# Patient Record
Sex: FEMALE | Race: WHITE | Employment: OTHER | URBAN - METROPOLITAN AREA
[De-identification: names, ages, dates, MRNs, and addresses within clinical notes are randomized per-mention and may not be internally consistent; named-entity substitution may affect disease eponyms.]

---

## 2017-01-31 PROBLEM — I70.0 ATHEROSCLEROSIS OF AORTA (HCC): Status: ACTIVE | Noted: 2017-01-31

## 2017-01-31 PROBLEM — I77.9 BILATERAL CAROTID ARTERY DISEASE (HCC): Status: ACTIVE | Noted: 2017-01-31

## 2017-03-31 ENCOUNTER — HOSPITAL ENCOUNTER (OUTPATIENT)
Dept: CT IMAGING | Facility: HOSPITAL | Age: 67
Discharge: HOME | End: 2017-03-31
Attending: FAMILY MEDICINE

## 2017-03-31 DIAGNOSIS — Z13.9 ENCOUNTER FOR SCREENING: ICD-10-CM

## 2017-04-03 PROBLEM — C50.919 BREAST CANCER (HCC): Status: ACTIVE | Noted: 2017-04-03

## 2017-04-04 ENCOUNTER — TELEPHONE (OUTPATIENT)
Dept: HEMATOLOGY/ONCOLOGY | Facility: HOSPITAL | Age: 67
End: 2017-04-04

## 2017-04-04 ENCOUNTER — OFFICE VISIT (OUTPATIENT)
Dept: HEMATOLOGY/ONCOLOGY | Facility: HOSPITAL | Age: 67
End: 2017-04-04
Attending: INTERNAL MEDICINE
Payer: MEDICARE

## 2017-04-04 VITALS
SYSTOLIC BLOOD PRESSURE: 127 MMHG | DIASTOLIC BLOOD PRESSURE: 67 MMHG | TEMPERATURE: 98 F | BODY MASS INDEX: 23.29 KG/M2 | OXYGEN SATURATION: 99 % | HEIGHT: 67.99 IN | WEIGHT: 153.63 LBS | HEART RATE: 92 BPM | RESPIRATION RATE: 18 BRPM

## 2017-04-04 DIAGNOSIS — M89.9 BONE DISORDER: ICD-10-CM

## 2017-04-04 DIAGNOSIS — G47.00 INSOMNIA, UNSPECIFIED TYPE: ICD-10-CM

## 2017-04-04 DIAGNOSIS — C50.811 MALIGNANT NEOPLASM OF OVERLAPPING SITES OF RIGHT FEMALE BREAST (HCC): ICD-10-CM

## 2017-04-04 DIAGNOSIS — M85.80 OSTEOPENIA, UNSPECIFIED LOCATION: Primary | ICD-10-CM

## 2017-04-04 DIAGNOSIS — R92.8 ABNORMAL MAMMOGRAM OF LEFT BREAST: ICD-10-CM

## 2017-04-04 PROCEDURE — 99214 OFFICE O/P EST MOD 30 MIN: CPT | Performed by: INTERNAL MEDICINE

## 2017-04-04 RX ORDER — ALPRAZOLAM 0.5 MG/1
0.5 TABLET ORAL NIGHTLY PRN
Qty: 30 TABLET | Refills: 3 | Status: SHIPPED | OUTPATIENT
Start: 2017-04-04 | End: 2019-01-08

## 2017-04-04 NOTE — PROGRESS NOTES
[facility] Progress Note      Patient Name:  Irina Sharma  YOB: 1950  Medical Record Number:  [id number]    Date of visit:  4/4/2017    CHIEF COMPLAINT: Stage I right breast carcinoma, status post mastectomy and reconstruction    H Take by mouth. Disp:  Rfl:    Multiple Vitamins-Minerals (RA ONE DAILY GUMMY VITES) Oral Chew Tab Chew  by mouth daily. Disp:  Rfl:    Misc Natural Products (OSTEO BI-FLEX ADV DOUBLE ST OR) Take 1 tablet by mouth daily.  Disp:  Rfl:    ascorbic acid (VITAMI llargest focus 1.5 cm,  ER/DE positive, HER2 negative with  Oncotype DX recurrence score was 10. She started anastrozole in 3/12 but took it sporadically due to vasomotor symptoms and arthralgias and finally  stopped it completely in 10/16.  She does not wi

## 2017-04-04 NOTE — PROGRESS NOTES
Pt here for 6 month MD f/u for hx of RB ca. Continues to be off Arimidex. States joint pain is better, but still having mild hot flashes. Pt reports that she is having trouble staying asleep most nights, would like to try xanax.      Education Record    Meredith Roy

## 2017-04-04 NOTE — TELEPHONE ENCOUNTER
Yessi Rdz MD  [address]                     Call, labs ok         Patient made aware of results and verbalized understanding.

## 2017-04-30 ENCOUNTER — SNF/IP PROF CHARGE ONLY (OUTPATIENT)
Dept: HEMATOLOGY/ONCOLOGY | Facility: HOSPITAL | Age: 67
End: 2017-04-30

## 2017-04-30 DIAGNOSIS — C50.811 MALIGNANT NEOPLASM OF OVERLAPPING SITES OF RIGHT BREAST IN FEMALE, ESTROGEN RECEPTOR POSITIVE (HCC): Primary | ICD-10-CM

## 2017-04-30 DIAGNOSIS — Z17.0 MALIGNANT NEOPLASM OF OVERLAPPING SITES OF RIGHT BREAST IN FEMALE, ESTROGEN RECEPTOR POSITIVE (HCC): Primary | ICD-10-CM

## 2017-04-30 PROCEDURE — G9678 ONCOLOGY CARE MODEL SERVICE: HCPCS | Performed by: INTERNAL MEDICINE

## 2017-05-25 PROCEDURE — 88305 TISSUE EXAM BY PATHOLOGIST: CPT | Performed by: INTERNAL MEDICINE

## 2017-05-28 PROBLEM — K63.5 POLYP OF TRANSVERSE COLON: Status: ACTIVE | Noted: 2017-05-28

## 2017-05-31 ENCOUNTER — SNF/IP PROF CHARGE ONLY (OUTPATIENT)
Dept: HEMATOLOGY/ONCOLOGY | Facility: HOSPITAL | Age: 67
End: 2017-05-31

## 2017-05-31 DIAGNOSIS — C50.811 MALIGNANT NEOPLASM OF OVERLAPPING SITES OF RIGHT BREAST IN FEMALE, ESTROGEN RECEPTOR POSITIVE (HCC): Primary | ICD-10-CM

## 2017-05-31 DIAGNOSIS — Z17.0 MALIGNANT NEOPLASM OF OVERLAPPING SITES OF RIGHT BREAST IN FEMALE, ESTROGEN RECEPTOR POSITIVE (HCC): Primary | ICD-10-CM

## 2017-05-31 PROCEDURE — G9678 ONCOLOGY CARE MODEL SERVICE: HCPCS | Performed by: INTERNAL MEDICINE

## 2017-06-30 ENCOUNTER — SNF/IP PROF CHARGE ONLY (OUTPATIENT)
Dept: HEMATOLOGY/ONCOLOGY | Facility: HOSPITAL | Age: 67
End: 2017-06-30

## 2017-06-30 DIAGNOSIS — Z17.0 MALIGNANT NEOPLASM OF OVERLAPPING SITES OF RIGHT BREAST IN FEMALE, ESTROGEN RECEPTOR POSITIVE (HCC): ICD-10-CM

## 2017-06-30 DIAGNOSIS — C50.811 MALIGNANT NEOPLASM OF OVERLAPPING SITES OF RIGHT BREAST IN FEMALE, ESTROGEN RECEPTOR POSITIVE (HCC): ICD-10-CM

## 2017-06-30 PROCEDURE — G9678 ONCOLOGY CARE MODEL SERVICE: HCPCS | Performed by: INTERNAL MEDICINE

## 2017-07-05 ENCOUNTER — HOSPITAL ENCOUNTER (OUTPATIENT)
Dept: BONE DENSITY | Age: 67
Discharge: HOME | End: 2017-07-05
Attending: INTERNAL MEDICINE
Payer: MEDICARE

## 2017-07-05 ENCOUNTER — TELEPHONE (OUTPATIENT)
Dept: HEMATOLOGY/ONCOLOGY | Facility: HOSPITAL | Age: 67
End: 2017-07-05

## 2017-07-05 DIAGNOSIS — M89.9 BONE DISORDER: ICD-10-CM

## 2017-07-05 DIAGNOSIS — M85.80 OSTEOPENIA, UNSPECIFIED LOCATION: ICD-10-CM

## 2017-07-05 DIAGNOSIS — R92.8 ABNORMAL MAMMOGRAM OF LEFT BREAST: ICD-10-CM

## 2017-07-05 DIAGNOSIS — C50.811 MALIGNANT NEOPLASM OF OVERLAPPING SITES OF RIGHT FEMALE BREAST (HCC): ICD-10-CM

## 2017-07-05 PROCEDURE — 77080 DXA BONE DENSITY AXIAL: CPT | Performed by: INTERNAL MEDICINE

## 2017-07-05 NOTE — TELEPHONE ENCOUNTER
Merrick Garcia MD  [address] 41             Call, DEXA minimally worse. Continue weight bearing exercises. Repeat DEXA 2 years      Left VM with results and number to call back with any questions.

## 2017-07-12 ENCOUNTER — HOSPITAL ENCOUNTER (OUTPATIENT)
Dept: MAMMOGRAPHY | Facility: HOSPITAL | Age: 67
Discharge: HOME | End: 2017-07-12
Attending: INTERNAL MEDICINE
Payer: MEDICARE

## 2017-07-12 DIAGNOSIS — M85.80 OSTEOPENIA, UNSPECIFIED LOCATION: ICD-10-CM

## 2017-07-12 DIAGNOSIS — C50.811 MALIGNANT NEOPLASM OF OVERLAPPING SITES OF RIGHT FEMALE BREAST (HCC): ICD-10-CM

## 2017-07-12 DIAGNOSIS — R92.8 ABNORMAL MAMMOGRAM OF LEFT BREAST: ICD-10-CM

## 2017-07-12 DIAGNOSIS — M89.9 BONE DISORDER: ICD-10-CM

## 2017-08-11 ENCOUNTER — HOSPITAL ENCOUNTER (OUTPATIENT)
Dept: MAMMOGRAPHY | Facility: HOSPITAL | Age: 67
Discharge: HOME | End: 2017-08-11
Attending: INTERNAL MEDICINE
Payer: MEDICARE

## 2017-08-11 PROCEDURE — 77061 BREAST TOMOSYNTHESIS UNI: CPT | Performed by: INTERNAL MEDICINE

## 2017-08-11 PROCEDURE — 77065 DX MAMMO INCL CAD UNI: CPT | Performed by: INTERNAL MEDICINE

## 2017-10-10 ENCOUNTER — OFFICE VISIT (OUTPATIENT)
Dept: HEMATOLOGY/ONCOLOGY | Facility: HOSPITAL | Age: 67
End: 2017-10-10
Attending: INTERNAL MEDICINE
Payer: MEDICARE

## 2017-10-10 VITALS
BODY MASS INDEX: 23.79 KG/M2 | TEMPERATURE: 98 F | HEART RATE: 84 BPM | WEIGHT: 157 LBS | HEIGHT: 67.99 IN | RESPIRATION RATE: 18 BRPM | DIASTOLIC BLOOD PRESSURE: 79 MMHG | SYSTOLIC BLOOD PRESSURE: 141 MMHG | OXYGEN SATURATION: 96 %

## 2017-10-10 DIAGNOSIS — Z17.0 MALIGNANT NEOPLASM OF OVERLAPPING SITES OF RIGHT BREAST IN FEMALE, ESTROGEN RECEPTOR POSITIVE (HCC): Primary | ICD-10-CM

## 2017-10-10 DIAGNOSIS — M85.80 OSTEOPENIA, UNSPECIFIED LOCATION: ICD-10-CM

## 2017-10-10 DIAGNOSIS — C50.811 MALIGNANT NEOPLASM OF OVERLAPPING SITES OF RIGHT BREAST IN FEMALE, ESTROGEN RECEPTOR POSITIVE (HCC): Primary | ICD-10-CM

## 2017-10-10 PROCEDURE — 99213 OFFICE O/P EST LOW 20 MIN: CPT | Performed by: INTERNAL MEDICINE

## 2017-10-10 NOTE — PROGRESS NOTES
Patient here for 6 month follow up. Patient states she is feeling good. Patient denies any concerns. Patient asked to change into a gown.     Education Record    Learner:  Patient    Disease / Diagnosis: Breast CA    Barriers / Limitations:  None   Comments

## 2017-10-10 NOTE — PROGRESS NOTES
[facility] Progress Note      Patient Name:  Chris Arteaga  YOB: 1950  Medical Record Number:  [id number]    Date of visit:  10/10/2017    CHIEF COMPLAINT: Stage I right breast carcinoma, status post mastectomy and reconstruction acid (VITAMIN C) 500 MG Oral Tab Take 500 mg by mouth daily. Disp:  Rfl:    Calcium Carbonate-Vitamin D (CALCIUM + D) 600-200 MG-UNIT Oral Tab Take 600 mg by mouth 2 (two) times daily.  Disp:  Rfl:    Flaxseed, Linseed, (FLAX SEED OIL) 1000 MG Oral Cap Take it sporadically due to vasomotor symptoms and arthralgias and finally  stopped it completely in 10/16. She does not wish to consider another AI. Left mammogram 8/17 was okay and next is due 8/18.     # Osteopenia: DEXA 7/17 showed osteopenia with T score -

## 2017-12-18 ENCOUNTER — TELEPHONE (OUTPATIENT)
Dept: HEMATOLOGY/ONCOLOGY | Facility: HOSPITAL | Age: 67
End: 2017-12-18

## 2017-12-18 DIAGNOSIS — Z17.0 MALIGNANT NEOPLASM OF OVERLAPPING SITES OF RIGHT BREAST IN FEMALE, ESTROGEN RECEPTOR POSITIVE (HCC): Primary | ICD-10-CM

## 2017-12-18 DIAGNOSIS — C50.811 MALIGNANT NEOPLASM OF OVERLAPPING SITES OF RIGHT BREAST IN FEMALE, ESTROGEN RECEPTOR POSITIVE (HCC): Primary | ICD-10-CM

## 2017-12-18 NOTE — TELEPHONE ENCOUNTER
Pt calling for prosthetic bra rx.  Requesting order be faxed to [phone]  Order faxed per pt request.

## 2018-04-10 PROCEDURE — 81001 URINALYSIS AUTO W/SCOPE: CPT | Performed by: FAMILY MEDICINE

## 2018-04-17 PROBLEM — C50.811 MALIGNANT NEOPLASM OF OVERLAPPING SITES OF RIGHT BREAST IN FEMALE, ESTROGEN RECEPTOR POSITIVE (HCC): Status: ACTIVE | Noted: 2018-04-17

## 2018-04-17 PROBLEM — N94.10 DYSPAREUNIA IN FEMALE: Status: ACTIVE | Noted: 2018-04-17

## 2018-04-17 PROBLEM — E78.5 DYSLIPIDEMIA: Status: ACTIVE | Noted: 2018-04-17

## 2018-04-17 PROBLEM — D12.3 ADENOMATOUS POLYP OF TRANSVERSE COLON: Status: ACTIVE | Noted: 2018-04-17

## 2018-04-17 PROBLEM — Z17.0 MALIGNANT NEOPLASM OF OVERLAPPING SITES OF RIGHT BREAST IN FEMALE, ESTROGEN RECEPTOR POSITIVE (HCC): Status: ACTIVE | Noted: 2018-04-17

## 2018-08-13 ENCOUNTER — HOSPITAL ENCOUNTER (OUTPATIENT)
Dept: MAMMOGRAPHY | Facility: HOSPITAL | Age: 68
Discharge: HOME | End: 2018-08-13
Attending: INTERNAL MEDICINE
Payer: MEDICARE

## 2018-08-13 DIAGNOSIS — Z17.0 MALIGNANT NEOPLASM OF OVERLAPPING SITES OF RIGHT BREAST IN FEMALE, ESTROGEN RECEPTOR POSITIVE (HCC): ICD-10-CM

## 2018-08-13 DIAGNOSIS — C50.811 MALIGNANT NEOPLASM OF OVERLAPPING SITES OF RIGHT BREAST IN FEMALE, ESTROGEN RECEPTOR POSITIVE (HCC): ICD-10-CM

## 2018-08-13 DIAGNOSIS — Z85.3 HISTORY OF RIGHT BREAST CANCER: ICD-10-CM

## 2018-08-13 PROCEDURE — 76642 ULTRASOUND BREAST LIMITED: CPT | Performed by: INTERNAL MEDICINE

## 2018-08-13 PROCEDURE — 77065 DX MAMMO INCL CAD UNI: CPT | Performed by: INTERNAL MEDICINE

## 2018-08-13 PROCEDURE — 77061 BREAST TOMOSYNTHESIS UNI: CPT | Performed by: INTERNAL MEDICINE

## 2018-10-12 ENCOUNTER — OFFICE VISIT (OUTPATIENT)
Dept: HEMATOLOGY/ONCOLOGY | Facility: HOSPITAL | Age: 68
End: 2018-10-12
Attending: INTERNAL MEDICINE
Payer: MEDICARE

## 2018-10-12 VITALS
TEMPERATURE: 98 F | OXYGEN SATURATION: 98 % | HEIGHT: 67.99 IN | DIASTOLIC BLOOD PRESSURE: 80 MMHG | RESPIRATION RATE: 18 BRPM | BODY MASS INDEX: 24.49 KG/M2 | HEART RATE: 80 BPM | SYSTOLIC BLOOD PRESSURE: 143 MMHG | WEIGHT: 161.63 LBS

## 2018-10-12 DIAGNOSIS — Z01.419 WELL WOMAN EXAM: ICD-10-CM

## 2018-10-12 DIAGNOSIS — M85.80 OSTEOPENIA, UNSPECIFIED LOCATION: ICD-10-CM

## 2018-10-12 DIAGNOSIS — M89.9 BONE DISORDER: ICD-10-CM

## 2018-10-12 DIAGNOSIS — Z17.0 MALIGNANT NEOPLASM OF OVERLAPPING SITES OF RIGHT BREAST IN FEMALE, ESTROGEN RECEPTOR POSITIVE (HCC): Primary | ICD-10-CM

## 2018-10-12 DIAGNOSIS — Z12.31 ENCOUNTER FOR SCREENING MAMMOGRAM FOR MALIGNANT NEOPLASM OF BREAST: ICD-10-CM

## 2018-10-12 DIAGNOSIS — C50.811 MALIGNANT NEOPLASM OF OVERLAPPING SITES OF RIGHT BREAST IN FEMALE, ESTROGEN RECEPTOR POSITIVE (HCC): Primary | ICD-10-CM

## 2018-10-12 PROCEDURE — 99213 OFFICE O/P EST LOW 20 MIN: CPT | Performed by: INTERNAL MEDICINE

## 2018-10-12 NOTE — PROGRESS NOTES
1 year MD f/u for breast ca. Denies any issues or complaints at this time.      Outpatient Oncology Care Plan  Problem list:  knowledge deficit    Problems related to:    disease/disease progression    Interventions:  provided general teaching    Expected o

## 2019-04-22 PROBLEM — Z00.00 MEDICARE ANNUAL WELLNESS VISIT, SUBSEQUENT: Status: ACTIVE | Noted: 2019-04-22

## 2019-07-16 ENCOUNTER — TELEPHONE (OUTPATIENT)
Dept: HEMATOLOGY/ONCOLOGY | Facility: HOSPITAL | Age: 69
End: 2019-07-16

## 2019-07-16 DIAGNOSIS — C50.811 MALIGNANT NEOPLASM OF OVERLAPPING SITES OF RIGHT BREAST IN FEMALE, ESTROGEN RECEPTOR POSITIVE (HCC): Primary | ICD-10-CM

## 2019-07-16 DIAGNOSIS — Z17.0 MALIGNANT NEOPLASM OF OVERLAPPING SITES OF RIGHT BREAST IN FEMALE, ESTROGEN RECEPTOR POSITIVE (HCC): Primary | ICD-10-CM

## 2019-07-16 NOTE — TELEPHONE ENCOUNTER
Per Pt's request- new order for breast prosthetic and mastectomy bras faxed to [employer] at [phone].

## 2019-07-18 ENCOUNTER — HOSPITAL ENCOUNTER (OUTPATIENT)
Dept: MAMMOGRAPHY | Age: 69
Discharge: HOME | End: 2019-07-18
Attending: INTERNAL MEDICINE
Payer: MEDICARE

## 2019-07-18 ENCOUNTER — HOSPITAL ENCOUNTER (OUTPATIENT)
Dept: BONE DENSITY | Age: 69
Discharge: HOME | End: 2019-07-18
Attending: INTERNAL MEDICINE
Payer: MEDICARE

## 2019-07-18 DIAGNOSIS — Z12.31 ENCOUNTER FOR SCREENING MAMMOGRAM FOR MALIGNANT NEOPLASM OF BREAST: ICD-10-CM

## 2019-07-18 DIAGNOSIS — Z01.419 WELL WOMAN EXAM: ICD-10-CM

## 2019-07-18 DIAGNOSIS — M89.9 BONE DISORDER: ICD-10-CM

## 2019-07-18 PROCEDURE — 77067 SCR MAMMO BI INCL CAD: CPT | Performed by: INTERNAL MEDICINE

## 2019-07-18 PROCEDURE — 77063 BREAST TOMOSYNTHESIS BI: CPT | Performed by: INTERNAL MEDICINE

## 2019-07-18 PROCEDURE — 77080 DXA BONE DENSITY AXIAL: CPT | Performed by: INTERNAL MEDICINE

## 2019-10-14 NOTE — PROGRESS NOTES
[facility] Progress Note      Patient Name:  Bethel Julian  YOB: 1950  Medical Record Number:  [id number]    Date of visit: 10/15/2019    CHIEF COMPLAINT: Stage I right breast carcinoma, status post mastectomy and reconstruction Oral Tab, Take 600 mg by mouth daily.   , Disp: , Rfl:   Flaxseed, Linseed, (FLAX SEED OIL) 1000 MG Oral Cap, Take  by mouth., Disp: , Rfl:   Cholecalciferol (VITAMIN D) 1000 UNITS Oral Cap, Take 1,000 Units by mouth.  , Disp: , Rfl:         VITALS:  Height mg/dL    Total Protein 7.8 6.4 - 8.2 g/dL    Albumin 4.0 3.4 - 5.0 g/dL    Globulin  3.8 2.8 - 4.4 g/dL    A/G Ratio 1.1 1.0 - 2.0   VITAMIN D, 25-HYDROXY    Collection Time: 10/15/19 10:32 AM   Result Value Ref Range    25-Hydroxyvitamin D (Total) 21.2 (L Zoloft in the past few years back, wishes to try again, recommended she follow-up with PCP. # R chest wall tenderness: Palpable mass. Recommend she contact me if symptoms persist, would need CT chest then.     # R shoulder stiffness: PT.      ORDERS DAVID

## 2019-10-15 ENCOUNTER — OFFICE VISIT (OUTPATIENT)
Dept: HEMATOLOGY/ONCOLOGY | Facility: HOSPITAL | Age: 69
End: 2019-10-15
Attending: INTERNAL MEDICINE
Payer: MEDICARE

## 2019-10-15 VITALS
BODY MASS INDEX: 24 KG/M2 | OXYGEN SATURATION: 98 % | DIASTOLIC BLOOD PRESSURE: 78 MMHG | SYSTOLIC BLOOD PRESSURE: 148 MMHG | HEIGHT: 67.99 IN | HEART RATE: 80 BPM | TEMPERATURE: 97 F | RESPIRATION RATE: 18 BRPM | WEIGHT: 158.38 LBS

## 2019-10-15 DIAGNOSIS — M25.611 SHOULDER STIFFNESS, RIGHT: Primary | ICD-10-CM

## 2019-10-15 DIAGNOSIS — M89.9 BONE DISORDER: ICD-10-CM

## 2019-10-15 DIAGNOSIS — C50.811 MALIGNANT NEOPLASM OF OVERLAPPING SITES OF RIGHT BREAST IN FEMALE, ESTROGEN RECEPTOR POSITIVE (HCC): ICD-10-CM

## 2019-10-15 DIAGNOSIS — Z12.31 ENCOUNTER FOR SCREENING MAMMOGRAM FOR MALIGNANT NEOPLASM OF BREAST: ICD-10-CM

## 2019-10-15 DIAGNOSIS — Z17.0 MALIGNANT NEOPLASM OF OVERLAPPING SITES OF RIGHT BREAST IN FEMALE, ESTROGEN RECEPTOR POSITIVE (HCC): ICD-10-CM

## 2019-10-15 PROCEDURE — 99214 OFFICE O/P EST MOD 30 MIN: CPT | Performed by: INTERNAL MEDICINE

## 2019-10-15 NOTE — PROGRESS NOTES
MD f/u for RB ca. LB paco and dexa done 7/18/19.  Reports that she has been feeling depressed and very emotional. Offered SW visit today, but pt would like to discuss with MD first.     Education Record    Learner:  Patient    Barriers / Limitations:  None

## 2020-07-20 ENCOUNTER — HOSPITAL ENCOUNTER (OUTPATIENT)
Dept: MAMMOGRAPHY | Facility: HOSPITAL | Age: 70
Discharge: HOME | End: 2020-07-20
Attending: INTERNAL MEDICINE
Payer: MEDICARE

## 2020-07-20 DIAGNOSIS — Z17.0 MALIGNANT NEOPLASM OF OVERLAPPING SITES OF RIGHT BREAST IN FEMALE, ESTROGEN RECEPTOR POSITIVE (HCC): ICD-10-CM

## 2020-07-20 DIAGNOSIS — C50.811 MALIGNANT NEOPLASM OF OVERLAPPING SITES OF RIGHT BREAST IN FEMALE, ESTROGEN RECEPTOR POSITIVE (HCC): ICD-10-CM

## 2020-07-20 DIAGNOSIS — Z12.31 ENCOUNTER FOR SCREENING MAMMOGRAM FOR MALIGNANT NEOPLASM OF BREAST: ICD-10-CM

## 2020-07-20 PROCEDURE — 77063 BREAST TOMOSYNTHESIS BI: CPT | Performed by: INTERNAL MEDICINE

## 2020-07-20 PROCEDURE — 77067 SCR MAMMO BI INCL CAD: CPT | Performed by: INTERNAL MEDICINE

## 2020-10-15 NOTE — PROGRESS NOTES
[facility] Progress Note      Patient Name:  Lori Copeland  YOB: 1950  Medical Record Number:  [id number]    Date of visit: 10/16/2020    CHIEF COMPLAINT: Stage I Rt breast carcinoma, s/p mastectomy and reconstruction    HPI:   65 y mouth.     • Misc Natural Products (OSTEO BI-FLEX ADV DOUBLE ST OR) Take 1 tablet by mouth daily. • Calcium Carbonate-Vitamin D (CALCIUM + D) 600-200 MG-UNIT Oral Tab Take 600 mg by mouth daily.        • Flaxseed, Linseed, (FLAX SEED OIL) 1000 MG Oral C x10(3) uL    Monocyte Absolute 0.56 0.10 - 1.00 x10(3) uL    Eosinophil Absolute 0.14 0.00 - 0.70 x10(3) uL    Basophil Absolute 0.05 0.00 - 0.20 x10(3) uL    Immature Granulocyte Absolute 0.03 0.00 - 1.00 x10(3) uL    Neutrophil % 57.6 %    Lymphocyte % 2

## 2020-10-16 ENCOUNTER — TELEPHONE (OUTPATIENT)
Dept: HEMATOLOGY/ONCOLOGY | Facility: HOSPITAL | Age: 70
End: 2020-10-16

## 2020-10-16 ENCOUNTER — OFFICE VISIT (OUTPATIENT)
Dept: HEMATOLOGY/ONCOLOGY | Facility: HOSPITAL | Age: 70
End: 2020-10-16
Attending: INTERNAL MEDICINE
Payer: MEDICARE

## 2020-10-16 VITALS
SYSTOLIC BLOOD PRESSURE: 142 MMHG | OXYGEN SATURATION: 98 % | RESPIRATION RATE: 16 BRPM | BODY MASS INDEX: 25.41 KG/M2 | HEART RATE: 82 BPM | WEIGHT: 160 LBS | TEMPERATURE: 96 F | DIASTOLIC BLOOD PRESSURE: 73 MMHG | HEIGHT: 66.5 IN

## 2020-10-16 DIAGNOSIS — R06.00 DYSPNEA, UNSPECIFIED TYPE: ICD-10-CM

## 2020-10-16 DIAGNOSIS — C50.811 MALIGNANT NEOPLASM OF OVERLAPPING SITES OF RIGHT BREAST IN FEMALE, ESTROGEN RECEPTOR POSITIVE (HCC): Primary | ICD-10-CM

## 2020-10-16 DIAGNOSIS — C50.811 MALIGNANT NEOPLASM OF OVERLAPPING SITES OF RIGHT FEMALE BREAST (HCC): ICD-10-CM

## 2020-10-16 DIAGNOSIS — M89.9 BONE DISORDER: ICD-10-CM

## 2020-10-16 DIAGNOSIS — Z17.0 MALIGNANT NEOPLASM OF OVERLAPPING SITES OF RIGHT BREAST IN FEMALE, ESTROGEN RECEPTOR POSITIVE (HCC): Primary | ICD-10-CM

## 2020-10-16 DIAGNOSIS — R92.8 ABNORMAL MAMMOGRAM OF LEFT BREAST: ICD-10-CM

## 2020-10-16 DIAGNOSIS — M85.80 OSTEOPENIA, UNSPECIFIED LOCATION: ICD-10-CM

## 2020-10-16 DIAGNOSIS — Z78.0 POSTMENOPAUSAL: ICD-10-CM

## 2020-10-16 DIAGNOSIS — Z12.31 ENCOUNTER FOR SCREENING MAMMOGRAM FOR MALIGNANT NEOPLASM OF BREAST: ICD-10-CM

## 2020-10-16 PROCEDURE — 99214 OFFICE O/P EST MOD 30 MIN: CPT | Performed by: INTERNAL MEDICINE

## 2020-10-16 RX ORDER — ALPRAZOLAM 0.5 MG/1
0.5 TABLET ORAL NIGHTLY PRN
Qty: 30 TABLET | Refills: 3 | Status: SHIPPED | OUTPATIENT
Start: 2020-10-16 | End: 2021-11-23

## 2020-10-16 NOTE — PROGRESS NOTES
Annual MD visit for breast cancer.    Education Record    Learner:  Patient    Disease / [employer] [facility] of care    Barriers / Limitations:  None   Comments:    Method:  Discussion   Comments:    General Topics:  Plan of care reviewed   Comments:    Outcome:

## 2020-10-16 NOTE — TELEPHONE ENCOUNTER
Magda Rodriguez MD  [phone]             Call, all labs ok, vit d bit low. Either take 2000 u daily OTC or double what she's taking     LVM to review the above. Asked her to call back with any questions.

## 2020-11-02 ENCOUNTER — HOSPITAL ENCOUNTER (OUTPATIENT)
Dept: GENERAL RADIOLOGY | Age: 70
Discharge: HOME | End: 2020-11-02
Attending: INTERNAL MEDICINE
Payer: MEDICARE

## 2020-11-02 DIAGNOSIS — R06.00 DYSPNEA, UNSPECIFIED TYPE: ICD-10-CM

## 2020-11-02 PROCEDURE — 71046 X-RAY EXAM CHEST 2 VIEWS: CPT | Performed by: INTERNAL MEDICINE

## 2021-03-08 DIAGNOSIS — Z23 NEED FOR VACCINATION: ICD-10-CM

## 2021-06-28 PROBLEM — Z85.3 HISTORY OF BREAST CANCER: Status: ACTIVE | Noted: 2021-06-28

## 2021-06-28 PROBLEM — I65.23 ATHEROSCLEROSIS OF BOTH CAROTID ARTERIES: Status: ACTIVE | Noted: 2021-06-28

## 2021-07-08 PROBLEM — C50.811 MALIGNANT NEOPLASM OF OVERLAPPING SITES OF RIGHT BREAST IN FEMALE, ESTROGEN RECEPTOR POSITIVE (HCC): Status: ACTIVE | Noted: 2021-07-08

## 2021-07-08 PROBLEM — I77.9 BILATERAL CAROTID ARTERY DISEASE, UNSPECIFIED TYPE (HCC): Status: ACTIVE | Noted: 2021-07-08

## 2021-07-08 PROBLEM — D49.2 NEOPLASM OF SKIN OF FACE: Status: ACTIVE | Noted: 2021-07-08

## 2021-07-08 PROBLEM — Z17.0 MALIGNANT NEOPLASM OF OVERLAPPING SITES OF RIGHT BREAST IN FEMALE, ESTROGEN RECEPTOR POSITIVE (HCC): Status: ACTIVE | Noted: 2021-07-08

## 2021-10-10 NOTE — PROGRESS NOTES
[facility] Progress Note      Patient Name:  Camille Lagunas  YOB: 1950  Medical Record Number:  [id number]    Date of visit: 10/12/2018    CHIEF COMPLAINT: Stage I right breast carcinoma, status post mastectomy and reconstruction NSR noted on the monitor at this time (OSTEO BI-FLEX ADV DOUBLE ST OR) Take 1 tablet by mouth daily. Disp:  Rfl:    Calcium Carbonate-Vitamin D (CALCIUM + D) 600-200 MG-UNIT Oral Tab Take 600 mg by mouth daily.    Disp:  Rfl:    Flaxseed, Linseed, (FLAX SEED OIL) 1000 MG Oral Cap Take  by mouth compared to the past.  Next DEXA due 7/19. ORDERS PLACED:          KLAUDIA BEBETO 2D+3D DIAGNOSTIC KLAUDIA LEFT (CPT=77065/23563)      Return in about 1 year (around 10/12/2019) for Valerio Short M.D.     [facility]    [facility]

## 2022-01-03 PROBLEM — N95.0 POST-MENOPAUSAL BLEEDING: Status: ACTIVE | Noted: 2022-01-03

## 2022-01-03 PROBLEM — F33.0 MDD (MAJOR DEPRESSIVE DISORDER), RECURRENT EPISODE, MILD (HCC): Status: ACTIVE | Noted: 2022-01-03

## 2022-01-14 ENCOUNTER — LAB REQUISITION (OUTPATIENT)
Dept: LAB | Facility: HOSPITAL | Age: 72
End: 2022-01-14
Payer: MEDICARE

## 2022-01-14 DIAGNOSIS — N95.0 POSTMENOPAUSAL BLEEDING: ICD-10-CM

## 2022-01-14 PROCEDURE — 88305 TISSUE EXAM BY PATHOLOGIST: CPT | Performed by: OBSTETRICS & GYNECOLOGY

## 2022-01-18 NOTE — PROGRESS NOTES
Since pt is confirmed active on [email or web address]. Results released to [employer] with Dr's recommendations.

## (undated) NOTE — MR AVS SNAPSHOT
After Visit Summary   4/4/2017    Recardo Face    MRN: [id number]           Diagnoses this Visit     Osteopenia, unspecified location    -  Primary     Malignant neoplasm of overlapping sites of right female breast [facility])         Bone disorder LAB:  COMP METABOLIC PANEL (14)        Tuesday April 04, 2017     Imaging:  KLAUDIA BEBETO 2D+3D DIAGNOSTIC KLAUDIA LEFT (CPT=77065/64633)        Tuesday April 04, 2017     Imaging:  XR DEXA BONE DENSITOMETRY (CPT=77080)        Wednesday April 05, 2017 8:30 AM